# Patient Record
Sex: MALE | Race: WHITE | Employment: FULL TIME | URBAN - METROPOLITAN AREA
[De-identification: names, ages, dates, MRNs, and addresses within clinical notes are randomized per-mention and may not be internally consistent; named-entity substitution may affect disease eponyms.]

---

## 2022-07-19 ENCOUNTER — APPOINTMENT (OUTPATIENT)
Dept: CT IMAGING | Age: 42
End: 2022-07-19

## 2022-07-19 ENCOUNTER — HOSPITAL ENCOUNTER (EMERGENCY)
Age: 42
Discharge: HOME | End: 2022-07-19
Attending: EMERGENCY MEDICINE

## 2022-07-19 VITALS
HEART RATE: 85 BPM | OXYGEN SATURATION: 99 % | RESPIRATION RATE: 16 BRPM | SYSTOLIC BLOOD PRESSURE: 137 MMHG | TEMPERATURE: 97.5 F | WEIGHT: 176.15 LBS | DIASTOLIC BLOOD PRESSURE: 86 MMHG

## 2022-07-19 DIAGNOSIS — S01.81XA FACIAL LACERATION, INITIAL ENCOUNTER: Primary | ICD-10-CM

## 2022-07-19 PROCEDURE — 99283 EMERGENCY DEPT VISIT LOW MDM: CPT

## 2022-07-19 PROCEDURE — 12013 RPR F/E/E/N/L/M 2.6-5.0 CM: CPT

## 2022-07-19 RX ORDER — CEPHALEXIN 500 MG/1
500 CAPSULE ORAL 4 TIMES DAILY
Qty: 28 CAPSULE | Refills: 0 | Status: SHIPPED | OUTPATIENT
Start: 2022-07-19 | End: 2022-07-26

## 2022-07-19 ASSESSMENT — ENCOUNTER SYMPTOMS
STRIDOR: 0
CHOKING: 0
VOMITING: 0
BLOOD IN STOOL: 0
BACK PAIN: 0
COLOR CHANGE: 0
NAUSEA: 0
CHEST TIGHTNESS: 0
DIARRHEA: 0
EYE REDNESS: 0
CONSTIPATION: 0
PHOTOPHOBIA: 0
ANAL BLEEDING: 0
ABDOMINAL DISTENTION: 0
RECTAL PAIN: 0
EYE DISCHARGE: 0
EYE PAIN: 0
COUGH: 0
APNEA: 0
ABDOMINAL PAIN: 0
SHORTNESS OF BREATH: 0
WHEEZING: 0
EYE ITCHING: 0

## 2022-07-19 NOTE — ED NOTES
Clean dressing applied to lac. Non slip socks placed on patient due to him not having shoes.       Mihir Mendez RN  07/19/22 8906

## 2022-07-19 NOTE — ED NOTES
Pt arrived to ED with a lac to his R eyebrow after falling off of his friends porch. Pt states he is intoxicated and has been drinking beer. States his friend dropped him off.       Mihir Majano RN  07/19/22 4448

## 2022-07-19 NOTE — ED NOTES
D/C: Order noted for d/c. Pt confirmed d/c paperwork  have correct name. Discharge and education instructions reviewed with patient. Teach-back successful. Pt verbalized understanding and signed d/c papers. Pt denied questions at this time. No acute distress noted. Patient instructed to follow-up as noted - return to emergency department if symptoms worsen. Patient verbalized understanding. Discharged per EDMD with discharge instructions. Pt discharged  to private vehicle. Patient stable upon departure. Thanked patient for choosing [facility]) for care.           Mihir Mendez RN  07/19/22 1733

## 2022-07-19 NOTE — ED PROVIDER NOTES
[address] [facility]      Pt Name: Ashley Dejesus  MRN: [id number]  [place] 1980  Date of evaluation: 7/19/2022  Provider: Maria E Tilley MD    CHIEF COMPLAINT       Chief Complaint   Patient presents with    Laceration     Pt states he is intoxicated after drinking beer; stepped wrong and fell off of his friends porch; lac to R eyebrow       HISTORY OF PRESENT ILLNESS    Ashley Dejesus is a 42 y.o. male who presents to the emergency department with laceration. Patient endorses drinking alcohol tonight. Mechanical fall and cut his right eyebrow on the porch. No chest pain or shortness of breath. No LOC. Denies neck pain. States he just want stitches. Up-to-date with immunizations. Pain 1 out of 10 achy in nature. Symptoms started after fall. No other associated symptoms. Nursing Notes were reviewed. Including nursing noted for FM, Surgical History, Past Medical History, Social History, vitals, and allergies; agree with all. REVIEW OF SYSTEMS       Review of Systems   Constitutional:  Negative for activity change, appetite change, chills, diaphoresis, fatigue, fever and unexpected weight change. HENT:  Negative for congestion, dental problem, drooling, ear discharge and ear pain. Eyes:  Negative for photophobia, pain, discharge, redness, itching and visual disturbance. Respiratory:  Negative for apnea, cough, choking, chest tightness, shortness of breath, wheezing and stridor. Cardiovascular:  Negative for chest pain, palpitations and leg swelling. Gastrointestinal:  Negative for abdominal distention, abdominal pain, anal bleeding, blood in stool, constipation, diarrhea, nausea, rectal pain and vomiting. Endocrine: Negative for cold intolerance and heat intolerance. Genitourinary:  Negative for decreased urine volume and urgency. Musculoskeletal:  Negative for arthralgias and back pain.    Skin:  Negative for color change and pallor. Neurological:  Negative for facial asymmetry, speech difficulty and weakness. Hematological:  Negative for adenopathy. Does not bruise/bleed easily. Psychiatric/Behavioral:  Negative for agitation, behavioral problems, confusion and decreased concentration. Except as noted above the remainder of the review of systems was reviewed and negative. PAST MEDICAL HISTORY   No past medical history on file. SURGICAL HISTORY     No past surgical history on file. CURRENT MEDICATIONS       Discharge Medication List as of 7/19/2022  3:51 AM          ALLERGIES     Patient has no known allergies. FAMILY HISTORY      No family history on file. SOCIAL HISTORY       Social History     Socioeconomic History    Marital status: Single   Tobacco Use    Smoking status: Every Day     Packs/day: 1.00     Types: Cigarettes   Substance and Sexual Activity    Drug use: No    Sexual activity: Yes     Partners: Female       PHYSICAL EXAM       ED Triage Vitals [07/19/22 0245]   BP Temp Temp Source Heart Rate Resp SpO2 Height Weight   137/86 97.5 °F (36.4 °C) Oral 85 16 99 % -- 176 lb 2.4 oz (79.9 kg)       Physical Exam  Vitals and nursing note reviewed. Constitutional:       General: He is not in acute distress. Appearance: He is well-developed. He is not diaphoretic. HENT:      Head: Normocephalic. Laceration present. Eyes:      General:         Right eye: No discharge. Left eye: No discharge. Pupils: Pupils are equal, round, and reactive to light. Neck:      Thyroid: No thyromegaly. Trachea: No tracheal deviation. Cardiovascular:      Rate and Rhythm: Normal rate and regular rhythm. Heart sounds: No murmur heard. Pulmonary:      Breath sounds: No wheezing or rales. Chest:      Chest wall: No tenderness. Abdominal:      General: There is no distension. Palpations: Abdomen is soft. There is no mass. Tenderness:  There is no abdominal tenderness. There is no guarding or rebound. Musculoskeletal:         General: No tenderness or deformity. Normal range of motion. Cervical back: Normal range of motion. Skin:     General: Skin is warm. Coloration: Skin is not pale. Findings: No erythema or rash. Neurological:      Mental Status: He is alert. Cranial Nerves: No cranial nerve deficit. Motor: No abnormal muscle tone. Coordination: Coordination normal.       DIAGNOSTIC RESULTS     RADIOLOGY:   Non-plain film images such as CT, Ultrasoundand MRI are read by the radiologist. Plain radiographic images are visualized and preliminarily interpreted by the emergency physician with the below findings:    Refused    ED BEDSIDE ULTRASOUND:   Performed by ED Physician - none    LABS:  Labs Reviewed - No data to display    All other labs were withinnormal range or not returned as of this dictation. EMERGENCY DEPARTMENT COURSE and DIFFERENTIAL DIAGNOSIS/MDM:     PMH, Surgical Hx, FH, Social Hx reviewed by myself (ETOH usage, Tobacco usage, Drug usage reviewed by myself, no pertinent Hx)- No Pertinent Hx     Old records were reviewed by me    41-year-old with mechanical fall. Positive for laceration above right eyebrow. There are 2 lacerations that were repaired total of 11 stitches. His tetanus is up-to-date per himself. He refused CT. He has medical decision capacity and can refuse this. Refused AGAINST MEDICAL ADVICE he would answer all my questions appropriately. States to get stitches out in 10 to 12 days. He verbalized understanding. Antibiotics prophylactically. I estimate there is LOW risk for Sepsis, MI, Stroke, Tamponade, PTX, Toxicity or other life threatening etiology thus I consider the discharge disposition reasonable. The patient is at low risk for mortality based on demographic, history and clinical factors.  Given the best available information and clinical assessment, I estimate the risk of hospitalization to be greater than risk of treatment at home. I have explained to the patient that the risk could rapidly change, given precautions for return and instructions. Explained to patient that the risk for mortality is low based on demographic, history and clinical factors. I discussed with patient the results of evaluation in the ED, diagnosis, care, and prognosis. The plan is to discharge to home. Patient is in agreement with plan and questions have been answered. I also discussed with patient the reasons which may require a return visit and the importance of follow-up care. The patient is well-appearing, nontoxic, and improved at the time of discharge. Patient agrees to call to arrange follow-up care as directed. Patient understands to return immediately for worsening/change in symptoms. CRITICAL CARE TIME   Total Critical Caretime was 0 minutes, excluding separately reportable procedures. There was a high probability of clinically significant/life threatening deterioration in the patient's condition which required my urgent intervention.         PROCEDURES:  Lac Repair    Date/Time: 7/19/2022 5:27 AM  Performed by: Elizabeth Mathew MD  Authorized by: Elizabeth Mathew MD     Consent:     Consent obtained:  Verbal    Consent given by:  Patient    Risks, benefits, and alternatives were discussed: yes      Risks discussed:  Infection, need for additional repair, nerve damage, retained foreign body, tendon damage and vascular damage    Alternatives discussed:  No treatment  Universal protocol:     Procedure explained and questions answered to patient or proxy's satisfaction: yes      Relevant documents present and verified: yes      Test results available: yes      Imaging studies available: yes      Patient identity confirmed:  Verbally with patient  Anesthesia:     Anesthesia method:  Local infiltration    Local anesthetic:  Lidocaine 1% w/o epi  Laceration details:     Location:  Face Face location:  R eyebrow    Length (cm):  4    Depth (mm):  4  Pre-procedure details:     Preparation:  Patient was prepped and draped in usual sterile fashion  Exploration:     Imaging outcome: foreign body not noted      Wound exploration: wound explored through full range of motion      Contaminated: no    Treatment:     Area cleansed with:  Saline    Amount of cleaning:  Standard    Irrigation solution:  Sterile saline    Irrigation method:  Pressure wash    Visualized foreign bodies/material removed: no      Debridement:  None  Skin repair:     Repair method:  Sutures    Suture size:  5-0    Suture material:  Nylon    Suture technique:  Simple interrupted    Number of sutures:  11  Approximation:     Approximation:  Close  Repair type:     Repair type:  Complex  Post-procedure details:     Dressing:  Antibiotic ointment    Procedure completion:  Tolerated      FINAL IMPRESSION      1. Facial laceration, initial encounter          DISPOSITION/PLAN   DISPOSITION Decision To Discharge 07/19/2022 03:27:04 AM    PATIENT REFERRED TO:  PCP            DISCHARGE MEDICATIONS:  Discharge Medication List as of 7/19/2022  3:51 AM        START taking these medications    Details   cephALEXin (KEFLEX) 500 MG capsule Take 1 capsule by mouth in the morning and 1 capsule at noon and 1 capsule in the evening and 1 capsule before bedtime. Do all this for 7 days. , Disp-28 capsule, R-0Print                (Please note that portions ofthis note were completed with a voice recognition program.  Efforts were made to edit the dictations but occasionally words are mis-transcribed.)    Neil Buck MD(electronically signed)  Attending Emergency Physician       Neil Buck MD  07/19/22 7449

## 2022-07-21 ENCOUNTER — HOSPITAL ENCOUNTER (EMERGENCY)
Age: 42
Discharge: HOME | End: 2022-07-21

## 2022-07-21 VITALS
BODY MASS INDEX: 25.38 KG/M2 | HEIGHT: 72 IN | OXYGEN SATURATION: 98 % | DIASTOLIC BLOOD PRESSURE: 93 MMHG | TEMPERATURE: 97.7 F | HEART RATE: 75 BPM | RESPIRATION RATE: 14 BRPM | WEIGHT: 187.39 LBS | SYSTOLIC BLOOD PRESSURE: 154 MMHG

## 2022-07-21 DIAGNOSIS — S00.11XD BLACK EYE OF RIGHT SIDE, SUBSEQUENT ENCOUNTER: ICD-10-CM

## 2022-07-21 DIAGNOSIS — S01.81XD FOREHEAD LACERATION, SUBSEQUENT ENCOUNTER: Primary | ICD-10-CM

## 2022-07-21 PROCEDURE — 99282 EMERGENCY DEPT VISIT SF MDM: CPT

## 2022-07-21 ASSESSMENT — PAIN SCALES - GENERAL
PAINLEVEL_OUTOF10: 3
PAINLEVEL_OUTOF10: 3

## 2022-07-21 ASSESSMENT — PAIN - FUNCTIONAL ASSESSMENT
PAIN_FUNCTIONAL_ASSESSMENT: 0-10
PAIN_FUNCTIONAL_ASSESSMENT: 0-10

## 2022-07-21 ASSESSMENT — PAIN DESCRIPTION - FREQUENCY
FREQUENCY: INTERMITTENT
FREQUENCY: CONTINUOUS

## 2022-07-21 ASSESSMENT — PAIN DESCRIPTION - LOCATION: LOCATION: HEAD;EYE

## 2022-07-21 ASSESSMENT — PAIN DESCRIPTION - ORIENTATION: ORIENTATION: RIGHT

## 2022-07-21 ASSESSMENT — ENCOUNTER SYMPTOMS
FACIAL SWELLING: 0
COLOR CHANGE: 1

## 2022-07-21 ASSESSMENT — PAIN DESCRIPTION - DESCRIPTORS: DESCRIPTORS: ACHING;SORE

## 2022-07-21 ASSESSMENT — PAIN DESCRIPTION - PAIN TYPE: TYPE: ACUTE PAIN

## 2022-07-21 NOTE — ED PROVIDER NOTES
[address] [facility]      Pt Name: Renato Stoll  MRN: [id number]  [place] 1980  Date of evaluation: 7/21/2022  Provider: J Carlos Calloway PA-C    This patient was not seen and evaluated by the attending physician No att. providers found. CHIEF COMPLAINT      Chief Complaint: laceration      HISTORYOF PRESENT ILLNESS  (Location/Symptom, Timing/Onset, Context/Setting, Quality, Duration, Modifying Factors, Severity.)   Renato Stoll is a 42 y.o. male who presents to the emergency department for re-evaluation of laceration. Patient was here 2 days ago after falling off of his friends deck. He had a laceration repair to right forehead. He says that he kept the area clean and applied a new bandage yesterday. He then noticed some dried blood pooling in the bandage and was worried about dehiscence. He also is complaining of some bruising under his right eye. Nothing makes his symptoms better or worse. He rates discomfort 3 out of 10. Nursing Notes were reviewed and I agree. REVIEW OF SYSTEMS    (2-9 systems for level 4, 10 or more forlevel 5)     Review of Systems   Constitutional:  Negative for chills and fever. HENT:  Negative for facial swelling. Skin:  Positive for color change and wound. Neurological:  Negative for headaches. Positives and Pertinent negatives as per HPI. Except as noted above the remainder of the review of systems was reviewed and negative. PAST MEDICALHISTORY   No past medical history on file. SURGICAL HISTORY     No past surgical history on file. CURRENT MEDICATIONS       Previous Medications    CEPHALEXIN (KEFLEX) 500 MG CAPSULE    Take 1 capsule by mouth in the morning and 1 capsule at noon and 1 capsule in the evening and 1 capsule before bedtime. Do all this for 7 days. ALLERGIES     Patient has no allergy information on record.     FAMILY HISTORY     No family history on file.  No family status information on file. SOCIAL HISTORY    reports that he has been smoking cigarettes. He has been smoking an average of 1 pack per day. He does not have any smokeless tobacco history on file. He reports that he does not use drugs. PHYSICAL EXAM    (up to 7 for level 4, 8 or more for level 5)     ED Triage Vitals [07/21/22 1211]   BP Temp Temp Source Heart Rate Resp SpO2 Height Weight   (!) 154/93 97.7 °F (36.5 °C) Oral 75 14 98 % 6' (1.829 m) 187 lb 6.3 oz (85 kg)     Physical Exam  Vitals and nursing note reviewed. Constitutional:       General: He is not in acute distress. Appearance: He is well-developed. HENT:      Head: Normocephalic. Comments: Healing laceration with sutures in place right forehead with some dried blood over the right eyebrow. Dependent periorbital ecchymosis and edema of the right eye. Eyes:      Extraocular Movements: Extraocular movements intact. Pulmonary:      Effort: Pulmonary effort is normal. No respiratory distress. Musculoskeletal:         General: Normal range of motion. Cervical back: Neck supple. Skin:     General: Skin is warm and dry. Neurological:      Mental Status: He is alert and oriented to person, place, and time. Psychiatric:         Behavior: Behavior normal.          DIAGNOSTIC RESULTS     When ordered, EKGs are interpreted by the Emergency Department Physician in the absence of a cardiologist. Please see their note for interpretation of EKG    RADIOLOGY:         Interpretation per the Radiologist below, if available at the time of this note:    No orders to display       LABS:  Labs Reviewed - No data to display    When ordered, only abnormal lab results are displayed. All other labs are within normal range or not returned as of the dictation.     EMERGENCY DEPARTMENT COURSE and DIFFERENTIAL DIAGNOSIS/MDM:   Vitals:    Vitals:    07/21/22 1211   BP: (!) 154/93   Pulse: 75   Resp: 14   Temp: 97.7 °F (36.5 °C) TempSrc: Oral   SpO2: 98%   Weight: 187 lb 6.3 oz (85 kg)   Height: 6' (1.829 m)        I have evaluated this patient. My supervising physician was available for consultation. The patient has healing wound to the forehead. I removed the Tegaderm and wiped away some dried blood from his eyebrow. There is no evidence of dehiscence. The wound is healing well. He does have a little bit of maceration of the skin and I advised against Tegaderm bandaging. I think this will need some time open to the air. 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 I asked him to potentially wear a cotton dressing or Band-Aid on it while at work. He can have the sutures removed in another 2 to 3 days. He was also advised to apply ice to black eye. Discussed results, diagnosis and plan with patient and/or family. Questions addressed. Dispositionand follow-up agreed upon. Specific discharge instructions explained. The patient and/or family and I have discussed the diagnosis and risks, and we agree with discharging home to follow-up with their primary care,specialist or referral doctor. We also discussed returning to the Emergency Department immediately if new or worsening symptoms occur. We have discussed the symptoms which are most concerning that necessitate immediatereturn. PROCEDURES:  None    FINAL IMPRESSION      1. Forehead laceration, subsequent encounter    2.  Black eye of right side, subsequent encounter          DISPOSITION/PLAN   DISPOSITION Decision To Discharge 07/21/2022 12:33:40 PM      PATIENT REFERRED TO:  [facility]  [address] [facility]  [phone]  Go in 3 days  For suture removal      MEDICATIONS:  New Prescriptions    No medications on file       (Please note that portions of this note were completed with a voice recognition program.  Efforts were made toedit the dictations but occasionally words are mis-transcribed.)    HAZEL Chaney PA-C  07/21/22 9259

## 2022-07-21 NOTE — ED TRIAGE NOTES
Patient states he had stitches placed early Tuesday morning, laceration above right eye brow that is leaking blood and does not feel right.

## 2022-07-21 NOTE — DISCHARGE INSTRUCTIONS
Keep the wound clean and dry and open to the air. May cover with cloth covering while at work.  Return here in 3 days for suture removal.

## 2022-07-25 ENCOUNTER — HOSPITAL ENCOUNTER (EMERGENCY)
Age: 42
Discharge: HOME | End: 2022-07-25
Attending: EMERGENCY MEDICINE

## 2022-07-25 VITALS
HEART RATE: 72 BPM | OXYGEN SATURATION: 97 % | SYSTOLIC BLOOD PRESSURE: 129 MMHG | RESPIRATION RATE: 16 BRPM | TEMPERATURE: 97.1 F | DIASTOLIC BLOOD PRESSURE: 83 MMHG

## 2022-07-25 DIAGNOSIS — Z48.02 VISIT FOR SUTURE REMOVAL: Primary | ICD-10-CM

## 2022-07-25 PROCEDURE — 99282 EMERGENCY DEPT VISIT SF MDM: CPT

## 2022-07-25 ASSESSMENT — PAIN - FUNCTIONAL ASSESSMENT: PAIN_FUNCTIONAL_ASSESSMENT: NONE - DENIES PAIN

## 2022-07-25 NOTE — ED PROVIDER NOTES
CHIEF COMPLAINT  Suture / Staple Removal (Has 17 stitches above right eye)      HISTORY OF PRESENT ILLNESS  Ceferino Vieira is a 42 y.o. male who presents to the ED for suture removal.  Patient had a laceration just above his right eyebrow that was treated on 7/19/2022. Patient denies any drainage coming from the wound. States the wound has been healing well. Denies any headache or vision changes. No fevers or chills. No new injuries. No other complaints, modifying factors or associated symptoms. Nursing notes reviewed. History reviewed. No pertinent past medical history. History reviewed. No pertinent surgical history. History reviewed. No pertinent family history. Social History     Socioeconomic History    Marital status: Single     Spouse name: Not on file    Number of children: Not on file    Years of education: Not on file    Highest education level: Not on file   Occupational History    Not on file   Tobacco Use    Smoking status: Every Day     Packs/day: 1.00     Types: Cigarettes    Smokeless tobacco: Not on file   Substance and Sexual Activity    Alcohol use: Not on file     Comment: rarely    Drug use: No    Sexual activity: Yes     Partners: Female   Other Topics Concern    Not on file   Social History Narrative    Not on file     Social Determinants of Health     Financial Resource Strain: Not on file   Food Insecurity: Not on file   Transportation Needs: Not on file   Physical Activity: Not on file   Stress: Not on file   Social Connections: Not on file   Intimate Partner Violence: Not on file   Housing Stability: Not on file     No current facility-administered medications for this encounter. Current Outpatient Medications   Medication Sig Dispense Refill    cephALEXin (KEFLEX) 500 MG capsule Take 1 capsule by mouth in the morning and 1 capsule at noon and 1 capsule in the evening and 1 capsule before bedtime. Do all this for 7 days.  28 capsule 0     No Known Allergies      REVIEW OF SYSTEMS  (up to 7 for level 4, 8 or more for level 5) pertinent positives per HPI otherwise noted to be negative    PHYSICAL EXAM  BP (!) 149/85   Pulse 80   Temp 97.5 °F (36.4 °C)   Resp 17   SpO2 97%      CONSTITUTIONAL: AOx4, cooperative with exam, afebrile   HEAD: normocephalic, atraumatic   EYES: PERRL, EOMI, anicteric sclera, healing ecchymosis around the right eye   ENT: Moist mucous membranes   LUNGS: Bilateral breath sounds, CTAB, no rales/ronchi/wheezes   CARDIOVASCULAR: RRR, normal S1/S2, no m/r/g, 2+ pulses throughout   ABDOMEN: Soft, non-tender, non-distended, +BS   NEUROLOGIC:  MAEx4, GCS 15   MUSCULOSKELETAL: No clubbing, cyanosis or edema   SKIN: No rash, V shaped laceration just above the right eyebrow that is well-healed with sutures in place         RADIOLOGY  X-RAYS:  I have reviewed radiologic plain film image(s). ALL OTHER NON-PLAIN FILM IMAGES SUCH AS CT, ULTRASOUND AND MRI HAVE BEEN READ BY THE RADIOLOGIST. No orders to display          EKG INTERPRETATION  None    PROCEDURES    Suture/ Staple Removal Procedure Note    Indication: Wound healed    Procedure: The patient was placed in the appropriate position and the sutures were removed without difficulty. Other items: Suture count: 17    The patient tolerated the procedure well. Complications: None           ED COURSE/MDM  Laceration, suture removal  Patient seen and evaluated. History and physical as above. Nontoxic, afebrile. Patient presents for suture removal.  Sutures were removed. Patient tolerated well. Please see procedure note. Plan for discharge with outpatient follow-up. Discussed keeping the area clean and dry. Return instruction provided. All questions answered prior to discharge. Is this patient to be included in the SEP-1 Core Measure due to severe sepsis or septic shock? No   Exclusion criteria - the patient is NOT to be included for SEP-1 Core Measure due to:   Infection is not suspected      I estimate there is LOW risk for CELLULITIS, COMPARTMENT SYNDROME, NECROTIZING FASCIITIS, TENDON OR NEUROVASCULAR INJURY, or FOREIGN BODY, thus I consider the discharge disposition reasonable. Also, there is no evidence or peritonitis, sepsis, or toxicity. Geraldine Duenas and I have discussed the diagnosis and risks, and we agree with discharging home to follow-up with their primary doctor. We also discussed returning to the Emergency Department immediately if new or worsening symptoms occur. We have discussed the symptoms which are most concerning (e.g., changing or worsening pain, fever, numbness, weakness, cool or painful digits) that necessitate immediate return. Patient was given scripts for the following medications. I counseled patient how to take these medications. New Prescriptions    No medications on file           CLINICAL IMPRESSION  1. Visit for suture removal        Blood pressure (!) 149/85, pulse 80, temperature 97.5 °F (36.4 °C), resp. rate 17, SpO2 97 %. DISPOSITION  Patient was discharged to home in good condition. Your PCP    Call today  For a follow up appointment. Disclaimer: All medical record entries made by [address] [place] [address] [address] [facility].       (Please note that this note was completed with a voice recognition program. Every attempt was made to edit the dictations, but inevitably there remain words that are mis-transcribed.)            Audrey Wild MD  07/25/22 3416

## 2022-07-25 NOTE — ED NOTES
D/C: Order noted for d/c. Pt confirmed d/c paperwork  have correct name. Discharge and education instructions reviewed with patient. Teach-back successful. Pt verbalized understanding and signed d/c papers. Pt denied questions at this time. No acute distress noted. Patient instructed to follow-up as noted - return to emergency department if symptoms worsen. Patient verbalized understanding. Discharged per EDMD with discharge instructions. Pt discharged to private vehicle. Patient stable upon departure. Thanked patient for choosing [facility]) for care. Provider aware of patient pain at time of discharge.        Sergio France  07/25/22 5193
